# Patient Record
Sex: MALE | Race: WHITE | NOT HISPANIC OR LATINO | Employment: FULL TIME | URBAN - NONMETROPOLITAN AREA
[De-identification: names, ages, dates, MRNs, and addresses within clinical notes are randomized per-mention and may not be internally consistent; named-entity substitution may affect disease eponyms.]

---

## 2023-11-06 NOTE — PROGRESS NOTES
Assessment & Plan     1. Chronic cough  Chronic, progressing. Differential includes infection, seasonal allergies, postnasal drip, GERD, eosinophilic esophagitis, asthma, COPD, related to prior chemical exposures, other underlying pulmonary cause, underlying cardiac cause, malignancy, etc.  Vitals and exam stable.  Chest x-ray unremarkable. Will trial course of oral antihistamine and nasal steroid for coverage of sinus, allergies, and post nasal drip. If minimal improvement consider PFT and/or CT lung for additional evaluation.   - XR Chest 2 Views; Future        No follow-ups on file.    Emili Manuel PA-C  [facility] AND [facility]   Juan is a 58 year old, presenting for the following health issues:  Cough      HPI   Here for evaluation of cough similar symptoms.  Worse at night when laying down.  Somewhat productive.  Had similar symptoms for several months December last year that resolved and normal.  No associated fever/chills, sore throat, shortness of breath, wheezing, heartburn or reflux symptoms.  No known sick contacts.  No known seasonal allergies.  No known underlying history of cardiopulmonary disease.  Unable to review any prior medical records.  Reports the family history of lung cancer with his father.  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      .PAST MEDICAL HISTORY: History reviewed. No pertinent past medical history.    PAST SURGICAL HISTORY: History reviewed. No pertinent surgical history.    FAMILY HISTORY: History reviewed. No pertinent family history.    SOCIAL HISTORY:   Social History     Tobacco Use    Smoking status: Never    Smokeless tobacco: Never   Substance Use Topics    Alcohol use: Not on file      No Known Allergies  No current outpatient medications on file.     No current facility-administered medications for this visit.         Review of Systems   Per HPI        Objective    /78  "(BP Location: Right arm, Patient Position: Sitting, Cuff Size: Adult Regular)   Pulse 67   Temp 97.1  F (36.2  C) (Tympanic)   Resp 18   Ht 1.702 m (5' 7\")   Wt 70.2 kg (154 lb 12.8 oz)   SpO2 97%   BMI 24.25 kg/m    Body mass index is 24.25 kg/m .  Physical Exam   General: Pleasant, in no apparent distress.  Eyes: Sclera are white and conjunctiva are clear bilaterally. Lacrimal apparatus free of erythema, edema, and discharge bilaterally.  Ears: External ears without erythema or edema. Tympanic membranes are pearly white and without erythema, scarring or perforations bilaterally. External auditory canals are free of foreign bodies, erythema, ulcers, and masses.  Nose: External nose is symmetrical and free of lesions and deformities.   Oropharynx: Oral mucosa is pink and without ulcers, nodules, and white patches. Tongue is symmetrical, pink, and without masses or lesions. Pharynx is pink, symmetrical, and without lesions. Uvula is midline. Tonsils are pink, symmetrical, and without edema, ulcers, or exudates, and 1+ bilaterally.  Cardiovascular: Regular rate and rhythm with S1 equal to S2. No murmurs, friction rubs, or gallops.   Respiratory: Lungs are resonant and clear to auscultation bilaterally. No wheezes, crackles, or rhonchi.  Skin: No jaundice, pallor, rashes, or lesions on exposed skin.   Psych: Appropriate mood and affect.    No results found for any visits on 11/07/23.        "

## 2023-11-07 ENCOUNTER — OFFICE VISIT (OUTPATIENT)
Dept: FAMILY MEDICINE | Facility: OTHER | Age: 58
End: 2023-11-07
Attending: PHYSICIAN ASSISTANT
Payer: OTHER GOVERNMENT

## 2023-11-07 ENCOUNTER — HOSPITAL ENCOUNTER (OUTPATIENT)
Dept: GENERAL RADIOLOGY | Facility: OTHER | Age: 58
Discharge: HOME | End: 2023-11-07
Attending: PHYSICIAN ASSISTANT
Payer: OTHER GOVERNMENT

## 2023-11-07 VITALS
DIASTOLIC BLOOD PRESSURE: 78 MMHG | TEMPERATURE: 97.1 F | HEIGHT: 67 IN | RESPIRATION RATE: 18 BRPM | HEART RATE: 67 BPM | WEIGHT: 154.8 LBS | OXYGEN SATURATION: 97 % | BODY MASS INDEX: 24.3 KG/M2 | SYSTOLIC BLOOD PRESSURE: 125 MMHG

## 2023-11-07 DIAGNOSIS — R05.3 CHRONIC COUGH: Primary | ICD-10-CM

## 2023-11-07 DIAGNOSIS — R05.3 CHRONIC COUGH: ICD-10-CM

## 2023-11-07 PROCEDURE — 99214 OFFICE O/P EST MOD 30 MIN: CPT | Performed by: PHYSICIAN ASSISTANT

## 2023-11-07 PROCEDURE — 71046 X-RAY EXAM CHEST 2 VIEWS: CPT

## 2023-11-07 ASSESSMENT — PAIN SCALES - GENERAL: PAINLEVEL: NO PAIN (0)

## 2023-11-07 NOTE — NURSING NOTE
"Chief Complaint   Patient presents with    Cough       Initial /78 (BP Location: Right arm, Patient Position: Sitting, Cuff Size: Adult Regular)   Pulse 67   Temp 97.1  F (36.2  C) (Tympanic)   Resp 18   Ht 1.702 m (5' 7\")   Wt 70.2 kg (154 lb 12.8 oz)   SpO2 97%   BMI 24.25 kg/m   Estimated body mass index is 24.25 kg/m  as calculated from the following:    Height as of this encounter: 1.702 m (5' 7\").    Weight as of this encounter: 70.2 kg (154 lb 12.8 oz).  Medication Review: complete    The next two questions are to help us understand your food security.  If you are feeling you need any assistance in this area, we have resources available to support you today.          10/29/2023   SDOH- Food Insecurity   Within the past 12 months, did you worry that your food would run out before you got money to buy more? N   Within the past 12 months, did the food you bought just not last and you didn t have money to get more? N         Health Care Directive:  Patient does not have a Health Care Directive or Living Will: Discussed advance care planning with patient; information given to patient to review.    Yee Anderson      "

## 2023-11-08 RX ORDER — LORATADINE 10 MG/1
10 TABLET ORAL DAILY
Qty: 30 TABLET | Refills: 1 | Status: SHIPPED | OUTPATIENT
Start: 2023-11-08

## 2023-11-08 RX ORDER — FLUTICASONE PROPIONATE 50 MCG
1 SPRAY, SUSPENSION (ML) NASAL DAILY
Qty: 16 G | Refills: 1 | Status: SHIPPED | OUTPATIENT
Start: 2023-11-08

## 2023-11-21 ENCOUNTER — OFFICE VISIT (OUTPATIENT)
Dept: FAMILY MEDICINE | Facility: OTHER | Age: 58
End: 2023-11-21
Attending: FAMILY MEDICINE
Payer: OTHER GOVERNMENT

## 2023-11-21 VITALS
RESPIRATION RATE: 17 BRPM | BODY MASS INDEX: 24.5 KG/M2 | DIASTOLIC BLOOD PRESSURE: 70 MMHG | TEMPERATURE: 96.8 F | WEIGHT: 156.4 LBS | SYSTOLIC BLOOD PRESSURE: 106 MMHG | OXYGEN SATURATION: 97 % | HEART RATE: 70 BPM

## 2023-11-21 DIAGNOSIS — J01.00 ACUTE NON-RECURRENT MAXILLARY SINUSITIS: Primary | ICD-10-CM

## 2023-11-21 PROCEDURE — 99213 OFFICE O/P EST LOW 20 MIN: CPT | Performed by: FAMILY MEDICINE

## 2023-11-21 RX ORDER — AZITHROMYCIN 250 MG/1
TABLET, FILM COATED ORAL
Qty: 6 TABLET | Refills: 0 | Status: SHIPPED | OUTPATIENT
Start: 2023-11-21 | End: 2023-12-12

## 2023-11-21 RX ORDER — CHOLECALCIFEROL (VITAMIN D3) 50 MCG
TABLET ORAL
COMMUNITY
Start: 2023-11-01

## 2023-11-21 RX ORDER — PREDNISONE 20 MG/1
40 TABLET ORAL DAILY
Qty: 10 TABLET | Refills: 0 | Status: SHIPPED | OUTPATIENT
Start: 2023-11-21 | End: 2023-12-12

## 2023-11-21 RX ORDER — BENZONATATE 200 MG/1
200 CAPSULE ORAL 3 TIMES DAILY PRN
Qty: 30 CAPSULE | Refills: 1 | Status: SHIPPED | OUTPATIENT
Start: 2023-11-21

## 2023-11-21 NOTE — PROGRESS NOTES
Nursing Notes:   oRdy Willis LPN  11/21/2023  9:02 AM  Signed  Pt presents to clinic today for chronic cough.      Medication Reconciliation: complete  Rody Willis LPN    SUBJECTIVE:  Juan Anderson  is a 58 year old male who comes in today because of longstanding cough.  He saw Emili Manuel who had him try and oral antihistamine and nasal steroid. It didn't help.  He has never had to use an inhaler. No family history of asthma.  He is not a smoker.  No recent exposures.      His cough seems to be worse when lying down.  Had similar symptoms last year that resolved in December.  He has a family history of lung cancer in his father.  He had some occupational exposure to different chemicals.  He had had no fever chills sore throat shortness of breath wheezing heartburn or reflux symptoms.    He has trouble sleeping. He has had a bit of tan colored sputum.  No fever.  Cough is better during the day.     Past Medical, Family, and Social History reviewed and updated as noted below.   ROS is negative except as noted above       Allergies   Allergen Reactions    Cats Itching and Shortness Of Breath    Dogs Itching and Shortness Of Breath   , No family history on file.,   Current Outpatient Medications   Medication    azithromycin (ZITHROMAX) 250 MG tablet    benzonatate (TESSALON) 200 MG capsule    fluticasone (FLONASE) 50 MCG/ACT nasal spray    loratadine (CLARITIN) 10 MG tablet    predniSONE (DELTASONE) 20 MG tablet    QUERCETIN PO    vitamin D3 (CHOLECALCIFEROL) 50 mcg (2000 units) tablet     No current facility-administered medications for this visit.   , No past medical history on file., There are no problems to display for this patient.  , No past surgical history on file. and   Social History     Tobacco Use    Smoking status: Never    Smokeless tobacco: Never   Substance Use Topics    Alcohol use: Not on file     OBJECTIVE:  /70   Pulse 70   Temp 96.8  F (36  C) (Temporal)   Resp 17   Wt 70.9 kg  (156 lb 6.4 oz)   SpO2 97%   BMI 24.50 kg/m     EXAM:  Alert cooperative, no distress.  Nose is congested.  Sinuses are nontender to palpation but transilluminate poorly.  TMs are clear.  Throat is clear.  Neck is supple without significant adenopathy.  Lungs are clear, no rales rhonchi or wheezes are heard but on forced end expiration there is a scant end expiratory wheeze.  ASSESSMENT/Plan :    Juan was seen today for cough.    Diagnoses and all orders for this visit:    Acute non-recurrent maxillary sinusitis  -     azithromycin (ZITHROMAX) 250 MG tablet; Take 2 tablets (500 mg) by mouth daily for 1 day, THEN 1 tablet (250 mg) daily for 4 days.  -     predniSONE (DELTASONE) 20 MG tablet; Take 2 tablets (40 mg) by mouth daily for 5 days  -     benzonatate (TESSALON) 200 MG capsule; Take 1 capsule (200 mg) by mouth 3 times daily as needed for cough      I think he actually has a maxillary sinusitis.  He may have a little bit of reactive airway issue going on but I do not think he actually would need to use an inhaler.  Will treat with Zithromax and burst with prednisone for 5 days.  Will then use Tessalon Perles to suppress his cough.  If he has persistent issues, would then consider something like Symbicort for postinfectious cough to see if this would clear the rest of the way.  He will keep in touch with his progress.    Darnell Neil MD          Answers submitted by the patient for this visit:  General Questionnaire (Submitted on 11/21/2023)  Chief Complaint: Chronic problems general questions HPI Form  What is the reason for your visit today? : Chronic, worsening cough  How many servings of fruits and vegetables do you eat daily?: 2-3  On average, how many sweetened beverages do you drink each day (Examples: soda, juice, sweet tea, etc.  Do NOT count diet or artificially sweetened beverages)?: 4  How many minutes a day do you exercise enough to make your heart beat faster?: 9 or less  How many days a  week do you exercise enough to make your heart beat faster?: 3 or less  How many days per week do you miss taking your medication?: 0

## 2023-11-21 NOTE — NURSING NOTE
Pt presents to clinic today for chronic cough.      Medication Reconciliation: complete  Rody Willis LPN

## 2023-12-11 ENCOUNTER — MYC MEDICAL ADVICE (OUTPATIENT)
Dept: FAMILY MEDICINE | Facility: OTHER | Age: 58
End: 2023-12-11
Payer: OTHER GOVERNMENT

## 2023-12-11 DIAGNOSIS — J01.00 ACUTE NON-RECURRENT MAXILLARY SINUSITIS: ICD-10-CM

## 2023-12-12 RX ORDER — AZITHROMYCIN 250 MG/1
TABLET, FILM COATED ORAL
Qty: 6 TABLET | Refills: 0 | Status: SHIPPED | OUTPATIENT
Start: 2023-12-12 | End: 2023-12-16

## 2023-12-12 RX ORDER — PREDNISONE 20 MG/1
40 TABLET ORAL DAILY
Qty: 10 TABLET | Refills: 0 | Status: SHIPPED | OUTPATIENT
Start: 2023-12-12

## 2023-12-23 ENCOUNTER — HEALTH MAINTENANCE LETTER (OUTPATIENT)
Age: 58
End: 2023-12-23

## 2024-10-02 ENCOUNTER — HOSPITAL ENCOUNTER (OUTPATIENT)
Dept: GENERAL RADIOLOGY | Facility: OTHER | Age: 59
Discharge: HOME | End: 2024-10-02
Payer: OTHER GOVERNMENT

## 2024-10-02 ENCOUNTER — OFFICE VISIT (OUTPATIENT)
Dept: FAMILY MEDICINE | Facility: OTHER | Age: 59
End: 2024-10-02
Payer: OTHER GOVERNMENT

## 2024-10-02 VITALS
BODY MASS INDEX: 23.34 KG/M2 | TEMPERATURE: 97.5 F | DIASTOLIC BLOOD PRESSURE: 80 MMHG | SYSTOLIC BLOOD PRESSURE: 110 MMHG | OXYGEN SATURATION: 96 % | RESPIRATION RATE: 16 BRPM | WEIGHT: 154 LBS | HEART RATE: 72 BPM | HEIGHT: 68 IN

## 2024-10-02 DIAGNOSIS — R05.3 PERSISTENT COUGH FOR 3 WEEKS OR LONGER: ICD-10-CM

## 2024-10-02 DIAGNOSIS — K21.9 GASTROESOPHAGEAL REFLUX DISEASE, UNSPECIFIED WHETHER ESOPHAGITIS PRESENT: Primary | ICD-10-CM

## 2024-10-02 DIAGNOSIS — H61.21 IMPACTED CERUMEN OF RIGHT EAR: ICD-10-CM

## 2024-10-02 LAB
ALBUMIN SERPL BCG-MCNC: 4.3 G/DL (ref 3.5–5.2)
ALP SERPL-CCNC: 76 U/L (ref 40–150)
ALT SERPL W P-5'-P-CCNC: 21 U/L (ref 0–70)
ANION GAP SERPL CALCULATED.3IONS-SCNC: 9 MMOL/L (ref 7–15)
AST SERPL W P-5'-P-CCNC: 17 U/L (ref 0–45)
BASOPHILS # BLD AUTO: 0.1 10E3/UL (ref 0–0.2)
BASOPHILS NFR BLD AUTO: 1 %
BILIRUB SERPL-MCNC: 0.4 MG/DL
BUN SERPL-MCNC: 10.3 MG/DL (ref 8–23)
CALCIUM SERPL-MCNC: 9.6 MG/DL (ref 8.8–10.4)
CHLORIDE SERPL-SCNC: 107 MMOL/L (ref 98–107)
CREAT SERPL-MCNC: 0.96 MG/DL (ref 0.67–1.17)
EGFRCR SERPLBLD CKD-EPI 2021: 90 ML/MIN/1.73M2
EOSINOPHIL # BLD AUTO: 1 10E3/UL (ref 0–0.7)
EOSINOPHIL NFR BLD AUTO: 12 %
ERYTHROCYTE [DISTWIDTH] IN BLOOD BY AUTOMATED COUNT: 12.7 % (ref 10–15)
GLUCOSE SERPL-MCNC: 104 MG/DL (ref 70–99)
HCO3 SERPL-SCNC: 26 MMOL/L (ref 22–29)
HCT VFR BLD AUTO: 49.9 % (ref 40–53)
HGB BLD-MCNC: 17.1 G/DL (ref 13.3–17.7)
IMM GRANULOCYTES # BLD: 0 10E3/UL
IMM GRANULOCYTES NFR BLD: 0 %
LYMPHOCYTES # BLD AUTO: 2.2 10E3/UL (ref 0.8–5.3)
LYMPHOCYTES NFR BLD AUTO: 27 %
MCH RBC QN AUTO: 31.6 PG (ref 26.5–33)
MCHC RBC AUTO-ENTMCNC: 34.3 G/DL (ref 31.5–36.5)
MCV RBC AUTO: 92 FL (ref 78–100)
MONOCYTES # BLD AUTO: 0.9 10E3/UL (ref 0–1.3)
MONOCYTES NFR BLD AUTO: 11 %
NEUTROPHILS # BLD AUTO: 4 10E3/UL (ref 1.6–8.3)
NEUTROPHILS NFR BLD AUTO: 49 %
NRBC # BLD AUTO: 0 10E3/UL
NRBC BLD AUTO-RTO: 0 /100
PLATELET # BLD AUTO: 272 10E3/UL (ref 150–450)
POTASSIUM SERPL-SCNC: 4.4 MMOL/L (ref 3.4–5.3)
PROT SERPL-MCNC: 7.4 G/DL (ref 6.4–8.3)
RBC # BLD AUTO: 5.41 10E6/UL (ref 4.4–5.9)
SODIUM SERPL-SCNC: 142 MMOL/L (ref 135–145)
WBC # BLD AUTO: 8.2 10E3/UL (ref 4–11)

## 2024-10-02 PROCEDURE — 69209 REMOVE IMPACTED EAR WAX UNI: CPT

## 2024-10-02 PROCEDURE — 80053 COMPREHEN METABOLIC PANEL: CPT | Mod: ZL

## 2024-10-02 PROCEDURE — 85025 COMPLETE CBC W/AUTO DIFF WBC: CPT | Mod: ZL

## 2024-10-02 PROCEDURE — 36415 COLL VENOUS BLD VENIPUNCTURE: CPT | Mod: ZL

## 2024-10-02 PROCEDURE — 71046 X-RAY EXAM CHEST 2 VIEWS: CPT

## 2024-10-02 PROCEDURE — 99214 OFFICE O/P EST MOD 30 MIN: CPT

## 2024-10-02 ASSESSMENT — PAIN SCALES - GENERAL: PAINLEVEL: NO PAIN (0)

## 2024-10-02 NOTE — PROGRESS NOTES
ASSESSMENT/PLAN:    I have reviewed the nursing notes.  I have reviewed the findings, diagnosis, plan and need for follow up with the patient.    1. Impacted cerumen of right ear    - REMOVE IMPACTED CERUMEN- ear lavage    2. Persistent cough for 3 weeks or longer    - XR Chest 2 Views- no focal consolidation, effusion or pneumothorax.  No suspicious osseous lesion or subdiaphragmatic free air.  No acute findings per radiologist.    - Comprehensive Metabolic Panel- unremarkable results    - CBC and Differential- unremarkable results    3. Gastroesophageal reflux disease, unspecified whether esophagitis present    - omeprazole (PRILOSEC) 20 MG DR capsule; Take 1 capsule (20 mg) by mouth 2 times daily for 14 days.  Dispense: 28 capsule; Refill: 0    - Please read the attached information on GERD and acid reducing medications for at home care treatment as discussed in the clinic today.    - Please return to clinic in 2 weeks for follow-up on effectiveness of medication that will be started today.    - May use over-the-counter Tylenol and ibuprofen as needed for pain, inflammation or fever    - Discussed warning signs/symptoms indicative of need to f/u    - Follow up if symptoms persist or worsen or concerns    - I explained my diagnostic considerations and recommendations to the patient, who voiced understanding and agreement with the treatment plan. All questions were answered. We discussed potential side effects of any prescribed or recommended therapies, as well as expectations for response to treatments.    ROYCE Barton CNP  10/2/2024  9:40 AM    HPI:    Juan Anderson is a 59 year old male who presents to [facility] today for concerns of cough for the past 2 years.  States that he occasionally has a productive cough that is clear now, used to be brownish color.  Non smoker.  States that cough is worse around 0200.  Denies having acid reflux.  At home care treatment consists of nothing other than cough  "drops.  Feels that the cough is getting worse the past week, states he coughed for 2 hours straight.  Denies fever, chills, shortness of breath, chest pain, sore throat, rhinorrhea, congestion, otalgia, headache, lightheadedness, dizziness, nausea, vomiting, diarrhea, constipation or rash.    No past medical history on file.  No past surgical history on file.  Social History     Tobacco Use    Smoking status: Never    Smokeless tobacco: Never   Substance Use Topics    Alcohol use: Not Currently     Current Outpatient Medications   Medication Sig Dispense Refill    benzonatate (TESSALON) 200 MG capsule Take 1 capsule (200 mg) by mouth 3 times daily as needed for cough 30 capsule 1    loratadine (CLARITIN) 10 MG tablet Take 1 tablet (10 mg) by mouth daily 30 tablet 1    fluticasone (FLONASE) 50 MCG/ACT nasal spray Spray 1 spray into both nostrils daily (Patient not taking: Reported on 10/2/2024) 16 g 1    predniSONE (DELTASONE) 20 MG tablet Take 2 tablets (40 mg) by mouth daily (Patient not taking: Reported on 10/2/2024) 10 tablet 0    QUERCETIN  mg (Patient not taking: Reported on 10/2/2024)      vitamin D3 (CHOLECALCIFEROL) 50 mcg (2000 units) tablet  (Patient not taking: Reported on 10/2/2024)       Allergies   Allergen Reactions    Cats Itching and Shortness Of Breath    Dogs Itching and Shortness Of Breath     Past medical history, past surgical history, current medications and allergies reviewed and accurate to the best of my knowledge.      ROS:  Refer to HPI    /80 (BP Location: Right arm, Patient Position: Sitting, Cuff Size: Adult Regular)   Pulse 72   Temp 97.5  F (36.4  C) (Temporal)   Resp 16   Ht 1.727 m (5' 8\")   Wt 69.9 kg (154 lb)   SpO2 96%   BMI 23.42 kg/m      EXAM:  General Appearance: Well appearing 59 year old male, appropriate appearance for age. No acute distress   Ears: Left TM intact, translucent with bony landmarks appreciated, no erythema, no effusion, no bulging, no " purulence.  After ear lavage, Right TM intact, translucent with bony landmarks appreciated, no erythema, no effusion, no bulging, no purulence.  Left auditory canal clear.  Right auditory canal clear.  Normal external ears, non tender.  Eyes: conjunctivae normal without erythema or irritation, corneas clear, no drainage or crusting, no eyelid swelling, pupils equal   Oropharynx: moist mucous membranes, posterior pharynx with mild erythema, tonsils symmetric, no erythema, no exudates or petechiae, no post nasal drip seen, no trismus, voice clear.    Sinuses:  No sinus tenderness upon palpation of the frontal or maxillary sinuses  Nose:  Bilateral nares: no erythema, no edema, no drainage or congestion   Neck: supple without adenopathy  Respiratory: normal chest wall and respirations.  Normal effort.  Clear to auscultation bilaterally, no wheezing, crackles or rhonchi.  No increased work of breathing.  Occasional dry cough appreciated.  Cardiac: RRR with no murmurs   Musculoskeletal:  Equal movement of bilateral upper extremities.  Equal movement of bilateral lower extremities.  Normal gait.    Neuro: Alert and oriented to person, place, and time.    Psychological: normal affect, alert, oriented, and pleasant.     Labs: Xray:  Results for orders placed or performed in visit on 10/02/24   XR Chest 2 Views     Status: None    Narrative    PROCEDURE:  XR CHEST 2 VIEWS    HISTORY: Persistent cough for 3 weeks or longer    COMPARISON: Chest radiographs 11/7/2023    FINDINGS: PA and lateral chest radiographs    Cardiomediastinal silhouette is within normal limits.  No focal consolidation, effusion or pneumothorax.    No suspicious osseous lesion or subdiaphragmatic free air.      Impression    IMPRESSION:    No acute findings.    YESENIA LENZ MD         SYSTEM ID:  [id number]   Comprehensive Metabolic Panel     Status: Abnormal   Result Value Ref Range    Sodium 142 135 - 145 mmol/L    Potassium 4.4 3.4 - 5.3 mmol/L    Carbon  Dioxide (CO2) 26 22 - 29 mmol/L    Anion Gap 9 7 - 15 mmol/L    Urea Nitrogen 10.3 8.0 - 23.0 mg/dL    Creatinine 0.96 0.67 - 1.17 mg/dL    GFR Estimate 90 >60 mL/min/1.73m2    Calcium 9.6 8.8 - 10.4 mg/dL    Chloride 107 98 - 107 mmol/L    Glucose 104 (H) 70 - 99 mg/dL    Alkaline Phosphatase 76 40 - 150 U/L    AST 17 0 - 45 U/L    ALT 21 0 - 70 U/L    Protein Total 7.4 6.4 - 8.3 g/dL    Albumin 4.3 3.5 - 5.2 g/dL    Bilirubin Total 0.4 <=1.2 mg/dL   CBC with platelets and differential     Status: Abnormal   Result Value Ref Range    WBC Count 8.2 4.0 - 11.0 10e3/uL    RBC Count 5.41 4.40 - 5.90 10e6/uL    Hemoglobin 17.1 13.3 - 17.7 g/dL    Hematocrit 49.9 40.0 - 53.0 %    MCV 92 78 - 100 fL    MCH 31.6 26.5 - 33.0 pg    MCHC 34.3 31.5 - 36.5 g/dL    RDW 12.7 10.0 - 15.0 %    Platelet Count 272 150 - 450 10e3/uL    % Neutrophils 49 %    % Lymphocytes 27 %    % Monocytes 11 %    % Eosinophils 12 %    % Basophils 1 %    % Immature Granulocytes 0 %    NRBCs per 100 WBC 0 <1 /100    Absolute Neutrophils 4.0 1.6 - 8.3 10e3/uL    Absolute Lymphocytes 2.2 0.8 - 5.3 10e3/uL    Absolute Monocytes 0.9 0.0 - 1.3 10e3/uL    Absolute Eosinophils 1.0 (H) 0.0 - 0.7 10e3/uL    Absolute Basophils 0.1 0.0 - 0.2 10e3/uL    Absolute Immature Granulocytes 0.0 <=0.4 10e3/uL    Absolute NRBCs 0.0 10e3/uL   CBC and Differential     Status: Abnormal    Narrative    The following orders were created for panel order CBC and Differential.  Procedure                               Abnormality         Status                     ---------                               -----------         ------                     CBC with platelets and d...[[id number]]  Abnormal            Final result                 Please view results for these tests on the individual orders.

## 2024-10-02 NOTE — PATIENT INSTRUCTIONS
I hope you feel better soon Juan!  Please follow-up in clinic in 2 weeks in regards to symptoms improving or not.

## 2024-10-02 NOTE — PROGRESS NOTES
Patient identified using two patient identifiers.  Ear exam showing wax occlusion completed by provider.  Solution: warm water was placed in the right ear(s) via irrigation tool: elephant ear.      Nhung Prabhakar LPN on 10/2/2024 at 12:16 PM

## 2024-10-02 NOTE — NURSING NOTE
"Pt presents to  for cough. Pt states he has had cough for a few years, but it has been worsening x1 wk. Pt has been taking loratadine and benzonatate PRN. Pt states in years past he was prescribed prednisone, azithromycin, tessalon and loratadine.     Chief Complaint   Patient presents with    Cough       FOOD SECURITY SCREENING QUESTIONS  Hunger Vital Signs:  Within the past 12 months we worried whether our food would run out before we got money to buy more. Never  Within the past 12 months the food we bought just didn't last and we didn't have money to get more. Never  Cherie Deajedon 10/2/2024 9:34 AM      Initial /80 (BP Location: Right arm, Patient Position: Sitting, Cuff Size: Adult Regular)   Pulse 72   Temp 97.5  F (36.4  C) (Temporal)   Resp 16   Ht 1.727 m (5' 8\")   Wt 69.9 kg (154 lb)   SpO2 96%   BMI 23.42 kg/m   Estimated body mass index is 23.42 kg/m  as calculated from the following:    Height as of this encounter: 1.727 m (5' 8\").    Weight as of this encounter: 69.9 kg (154 lb).  Medication Reconciliation: complete    Cherie Oscar  "

## 2024-10-17 ENCOUNTER — OFFICE VISIT (OUTPATIENT)
Dept: FAMILY MEDICINE | Facility: OTHER | Age: 59
End: 2024-10-17
Payer: OTHER GOVERNMENT

## 2024-10-17 VITALS
BODY MASS INDEX: 22.58 KG/M2 | RESPIRATION RATE: 16 BRPM | OXYGEN SATURATION: 97 % | WEIGHT: 149 LBS | TEMPERATURE: 97.3 F | SYSTOLIC BLOOD PRESSURE: 112 MMHG | DIASTOLIC BLOOD PRESSURE: 72 MMHG | HEART RATE: 61 BPM | HEIGHT: 68 IN

## 2024-10-17 DIAGNOSIS — R06.2 WHEEZING: ICD-10-CM

## 2024-10-17 DIAGNOSIS — R05.3 CHRONIC COUGH: Primary | ICD-10-CM

## 2024-10-17 DIAGNOSIS — K21.00 GASTROESOPHAGEAL REFLUX DISEASE WITH ESOPHAGITIS, UNSPECIFIED WHETHER HEMORRHAGE: ICD-10-CM

## 2024-10-17 PROCEDURE — 99214 OFFICE O/P EST MOD 30 MIN: CPT

## 2024-10-17 RX ORDER — ALBUTEROL SULFATE 90 UG/1
2 INHALANT RESPIRATORY (INHALATION) EVERY 6 HOURS PRN
Qty: 18 G | Refills: 3 | Status: SHIPPED | OUTPATIENT
Start: 2024-10-17

## 2024-10-17 RX ORDER — FAMOTIDINE 20 MG/1
20 TABLET, FILM COATED ORAL AT BEDTIME
Qty: 90 TABLET | Refills: 0 | Status: SHIPPED | OUTPATIENT
Start: 2024-10-17

## 2024-10-17 RX ORDER — OMEPRAZOLE 20 MG/1
20 TABLET, DELAYED RELEASE ORAL DAILY
Qty: 90 TABLET | Refills: 0 | Status: SHIPPED | OUTPATIENT
Start: 2024-10-17

## 2024-10-17 RX ORDER — FLUTICASONE PROPIONATE AND SALMETEROL 100; 50 UG/1; UG/1
1 POWDER RESPIRATORY (INHALATION) EVERY 12 HOURS
Qty: 60 EACH | Refills: 3 | Status: SHIPPED | OUTPATIENT
Start: 2024-10-17

## 2024-10-17 ASSESSMENT — PAIN SCALES - GENERAL: PAINLEVEL: NO PAIN (0)

## 2024-10-17 ASSESSMENT — LIFESTYLE VARIABLES
HOW OFTEN DO YOU HAVE A DRINK CONTAINING ALCOHOL: NEVER
HOW OFTEN DO YOU HAVE SIX OR MORE DRINKS ON ONE OCCASION: NEVER
HOW MANY STANDARD DRINKS CONTAINING ALCOHOL DO YOU HAVE ON A TYPICAL DAY: PATIENT DOES NOT DRINK
SKIP TO QUESTIONS 9-10: 1
AUDIT-C TOTAL SCORE: 0

## 2024-10-17 NOTE — PATIENT INSTRUCTIONS
I hope you feel that her soon Juan!  Please be sure to establish a PCP as soon as possible for follow-up in the next 2 to 4 weeks.

## 2024-10-17 NOTE — PROGRESS NOTES
ASSESSMENT/PLAN:    I have reviewed the nursing notes.  I have reviewed the findings, diagnosis, plan and need for follow up with the patient.    1. Gastroesophageal reflux disease with esophagitis, unspecified whether hemorrhage    - Adult GI  Referral - Consult Only; Future    - omeprazole (PRILOSEC OTC) 20 MG EC tablet; Take 1 tablet (20 mg) by mouth daily.  Dispense: 90 tablet; Refill: 0    - famotidine (PEPCID) 20 MG tablet; Take 1 tablet (20 mg) by mouth at bedtime.  Dispense: 90 tablet; Refill: 0    2. Wheezing  3. Chronic cough    - Pulmonary Function Test (); Future    - fluticasone-salmeterol (ADVAIR) 100-50 MCG/ACT inhaler; Inhale 1 puff into the lungs every 12 hours.  Dispense: 60 each; Refill: 3    - albuterol (PROAIR HFA/PROVENTIL HFA/VENTOLIN HFA) 108 (90 Base) MCG/ACT inhaler; Inhale 2 puffs into the lungs every 6 hours as needed for shortness of breath, wheezing or cough.  Dispense: 18 g; Refill: 3    - Recommended establishing a PCP to follow-up with in the next 2 to 4 weeks.     - May use over-the-counter Tylenol and ibuprofen as needed for pain, inflammation or fever    - Discussed warning signs/symptoms indicative of need to f/u    - Follow up if symptoms persist or worsen or concerns    - I explained my diagnostic considerations and recommendations to the patient, who voiced understanding and agreement with the treatment plan. All questions were answered. We discussed potential side effects of any prescribed or recommended therapies, as well as expectations for response to treatments.    ROYCE Barton CNP  10/17/2024  9:18 AM    HPI:    Juan Anderson is a 59 year old male who presents to [facility] today for concerns of cough for the past 2 years and wheezing after bad coughing spell.  States that his coughing spells have decreased to 2 a day since he began the omeprazole a couple weeks ago.  Prior to omeprazole he was having longer coughing spells.  Patient was seen on  "10/02/2024 for this chronic cough and possible GERD and was given a 2-week trial of omeprazole and told to follow-up with a PCP.  Patient states that he has definitely noticed a difference with his coughing spells not being as harsh or lasting as long but does have wheezing after coughing spells.  Patient states that he is not only concerned about GERD but that he may have asthma.  Patient denies fever, chills, shortness of breath, chest pain, nausea, vomiting, diarrhea, constipation, headache, lightheadedness, dizziness, sore throat, congestion, rhinorrhea, otalgia or rash.    No past medical history on file.  No past surgical history on file.  Social History     Tobacco Use    Smoking status: Never    Smokeless tobacco: Never   Substance Use Topics    Alcohol use: Not Currently     Current Outpatient Medications   Medication Sig Dispense Refill    benzonatate (TESSALON) 200 MG capsule Take 1 capsule (200 mg) by mouth 3 times daily as needed for cough (Patient not taking: Reported on 10/17/2024) 30 capsule 1    fluticasone (FLONASE) 50 MCG/ACT nasal spray Spray 1 spray into both nostrils daily (Patient not taking: Reported on 10/2/2024) 16 g 1    loratadine (CLARITIN) 10 MG tablet Take 1 tablet (10 mg) by mouth daily (Patient not taking: Reported on 10/17/2024) 30 tablet 1    predniSONE (DELTASONE) 20 MG tablet Take 2 tablets (40 mg) by mouth daily (Patient not taking: Reported on 10/2/2024) 10 tablet 0    QUERCETIN  mg (Patient not taking: Reported on 10/2/2024)      vitamin D3 (CHOLECALCIFEROL) 50 mcg (2000 units) tablet  (Patient not taking: Reported on 10/2/2024)       Allergies   Allergen Reactions    Cats Itching and Shortness Of Breath    Dogs Itching and Shortness Of Breath     Past medical history, past surgical history, current medications and allergies reviewed and accurate to the best of my knowledge.      ROS:  Refer to [facility]    Temp 97.3  F (36.3  C) (Tympanic)   Ht 1.727 m (5' 8\")   Wt 67.6 kg " (149 lb)   BMI 22.66 kg/m      EXAM:  General Appearance: Well appearing 59 year old male, appropriate appearance for age. No acute distress   Respiratory: normal chest wall and respirations.  Normal effort.  Clear to auscultation bilaterally, no wheezing, crackles or rhonchi.  No increased work of breathing.  No cough appreciated.  Cardiac: RRR with no murmurs  Musculoskeletal:  Equal movement of bilateral upper extremities.  Equal movement of bilateral lower extremities.  Normal gait.    Neuro: Alert and oriented to person, place, and time.    Psychological: normal affect, alert, oriented, and pleasant.

## 2024-10-17 NOTE — NURSING NOTE
"Chief Complaint   Patient presents with    Cough     With wheezing after coughing fits     Coughing and wheezing is ongoing and worsening over the past 2 years.  Tx with Omeprazole- which has lessened  length of coughing fits - but cough is still persistent.  Elevation at night helps. Improves after the rain.  Cough worsens with cold air, dust, laughing/activity, and when lying down.    Patient is concerned with asthma.    Initial /72 (BP Location: Left arm, Patient Position: Sitting, Cuff Size: Adult Regular)   Pulse 61   Temp 97.3  F (36.3  C) (Tympanic)   Resp 16   Ht 1.727 m (5' 8\")   Wt 67.6 kg (149 lb)   SpO2 97%   BMI 22.66 kg/m   Estimated body mass index is 22.66 kg/m  as calculated from the following:    Height as of this encounter: 1.727 m (5' 8\").    Weight as of this encounter: 67.6 kg (149 lb).     Advance Care Directive on file? n    FOOD SECURITY SCREENING QUESTIONS:    The next two questions are to help us understand your food security.  If you are feeling you need any assistance in this area, we have resources available to support you today.    Hunger Vital Signs:  Within the past 12 months we worried whether our food would run out before we got money to buy more. Never  Within the past 12 months the food we bought just didn't last and we didn't have money to get more. Never  Adriana Moore LPN,GORAN on 10/17/2024 at 9:19 AM      Adriana Moore LPN     "

## 2024-10-18 ENCOUNTER — TELEPHONE (OUTPATIENT)
Dept: SURGERY | Facility: OTHER | Age: 59
End: 2024-10-18
Payer: OTHER GOVERNMENT

## 2024-10-18 NOTE — TELEPHONE ENCOUNTER
[facility] Diagnostic Referral    Patient has a referral for an EGD with a diagnosis of Gastroesophageal reflux disease with esophagitis, unspecified whether hemorrhage.    Please advise.    Thank you,  Ronit Kahn on 10/18/2024 at 2:29 PM

## 2024-11-06 ENCOUNTER — OFFICE VISIT (OUTPATIENT)
Dept: FAMILY MEDICINE | Facility: OTHER | Age: 59
End: 2024-11-06
Attending: STUDENT IN AN ORGANIZED HEALTH CARE EDUCATION/TRAINING PROGRAM
Payer: OTHER GOVERNMENT

## 2024-11-06 VITALS
OXYGEN SATURATION: 98 % | TEMPERATURE: 97.1 F | HEIGHT: 68 IN | BODY MASS INDEX: 22.19 KG/M2 | RESPIRATION RATE: 18 BRPM | DIASTOLIC BLOOD PRESSURE: 64 MMHG | HEART RATE: 63 BPM | SYSTOLIC BLOOD PRESSURE: 106 MMHG | WEIGHT: 146.38 LBS

## 2024-11-06 DIAGNOSIS — R05.3 CHRONIC COUGH: Primary | ICD-10-CM

## 2024-11-06 DIAGNOSIS — R06.2 WHEEZING: ICD-10-CM

## 2024-11-06 PROCEDURE — 99214 OFFICE O/P EST MOD 30 MIN: CPT | Performed by: STUDENT IN AN ORGANIZED HEALTH CARE EDUCATION/TRAINING PROGRAM

## 2024-11-06 ASSESSMENT — PAIN SCALES - GENERAL: PAINLEVEL_OUTOF10: NO PAIN (0)

## 2024-11-06 NOTE — PROGRESS NOTES
Assessment & Plan   Problem List Items Addressed This Visit          Respiratory    Chronic cough - Primary    Wheezing      Suspect this is asthma as his symptoms flare in the fall (seasonal allergies?) as well as cold air and dust.   Likely some lung damage due to serving in [place]-- [facility]  Reassuring that symptoms resolved with advair use  Recommend getting PFT scheduled. Is not taking ppi or pepcid, ok to hold off for now and ok to hold off on EGD for now  Follow up if new or worsening symptoms              No follow-ups on file.      Gabbie Martinez is a 59 year old, presenting for the following health issues:  Establish Care (With Jostin Navarro MD ) and Medication Follow-up (Medication mangement)    History of Present Illness     Asthma:  He presents for follow up of asthma.  He has some cough, some wheezing, and some shortness of breath. He is not using a relief medication.  He does not miss any doses of his controller medication throughout the week. Patient is aware of the following triggers: cold air, dust mites, exercise or sports, gastric reflux, mold and pollens. The patient has had a visit to the Emergency Room, Urgent Care or Hospital due to asthma since the last clinic visit. He has been to the Emergency Room or Urgent Care 2 times.He has had a Hospitalization 0 times.    Reason for visit:  [facility] and [facility]    He eats 2-3 servings of fruits and vegetables daily.He consumes 1 sweetened beverage(s) daily.He exercises with enough effort to increase his heart rate 30 to 60 minutes per day.  He exercises with enough effort to increase his heart rate 3 or less days per week.   He is taking medications regularly.     Chronic cough/establish care  - 2 years of intermittent cough  - mostly notices each fall-- cold air and dust cause it to flare  - [facility]-- cxr negative for acute pathology. Rx for  "advair and albuterol, as well as pepcid and prilosec.   - advair cleared symptoms almost immediately  - no tobacco use  - no reflux or PND   - - served in [place] so has exposures. [facility]  - EGD and PFTs ordered not yet completed               Review of Systems  Constitutional, HEENT, cardiovascular, pulmonary, gi and gu systems are negative, except as otherwise noted.      Objective    /64 (BP Location: Right arm, Patient Position: Sitting, Cuff Size: Adult Regular)   Pulse 63   Temp 97.1  F (36.2  C) (Tympanic)   Resp 18   Ht 1.715 m (5' 7.5\")   Wt 66.4 kg (146 lb 6 oz)   SpO2 98%   BMI 22.59 kg/m    Body mass index is 22.59 kg/m .  Physical Exam   GENERAL: alert and no distress  RESP: lungs clear to auscultation - no rales, rhonchi or wheezes  CV: regular rate and rhythm, normal S1 S2, no S3 or S4, no murmur, click or rub, no peripheral edema   PSYCH: mentation appears normal, affect normal/bright    Office Visit on 10/02/2024   Component Date Value Ref Range Status    Sodium 10/02/2024 142  135 - 145 mmol/L Final    Potassium 10/02/2024 4.4  3.4 - 5.3 mmol/L Final    Carbon Dioxide (CO2) 10/02/2024 26  22 - 29 mmol/L Final    Anion Gap 10/02/2024 9  7 - 15 mmol/L Final    Urea Nitrogen 10/02/2024 10.3  8.0 - 23.0 mg/dL Final    Creatinine 10/02/2024 0.96  0.67 - 1.17 mg/dL Final    GFR Estimate 10/02/2024 90  >60 mL/min/1.73m2 Final    eGFR calculated using 2021 CKD-EPI equation.    Calcium 10/02/2024 9.6  8.8 - 10.4 mg/dL Final    Reference intervals for this test were updated on 7/16/2024 to reflect our healthy population more accurately. There may be differences in the flagging of prior results with similar values performed with this method. Those prior results can be interpreted in the context of the updated reference intervals.    Chloride 10/02/2024 107  98 - 107 mmol/L Final    Glucose 10/02/2024 104 (H)  70 - 99 mg/dL Final    Alkaline Phosphatase 10/02/2024 76  40 - 150 " U/L Final    AST 10/02/2024 17  0 - 45 U/L Final    ALT 10/02/2024 21  0 - 70 U/L Final    Protein Total 10/02/2024 7.4  6.4 - 8.3 g/dL Final    Albumin 10/02/2024 4.3  3.5 - 5.2 g/dL Final    Bilirubin Total 10/02/2024 0.4  <=1.2 mg/dL Final    WBC Count 10/02/2024 8.2  4.0 - 11.0 10e3/uL Final    RBC Count 10/02/2024 5.41  4.40 - 5.90 10e6/uL Final    Hemoglobin 10/02/2024 17.1  13.3 - 17.7 g/dL Final    Hematocrit 10/02/2024 49.9  40.0 - 53.0 % Final    MCV 10/02/2024 92  78 - 100 fL Final    MCH 10/02/2024 31.6  26.5 - 33.0 pg Final    MCHC 10/02/2024 34.3  31.5 - 36.5 g/dL Final    RDW 10/02/2024 12.7  10.0 - 15.0 % Final    Platelet Count 10/02/2024 272  150 - 450 10e3/uL Final    % Neutrophils 10/02/2024 49  % Final    % Lymphocytes 10/02/2024 27  % Final    % Monocytes 10/02/2024 11  % Final    % Eosinophils 10/02/2024 12  % Final    % Basophils 10/02/2024 1  % Final    % Immature Granulocytes 10/02/2024 0  % Final    NRBCs per 100 WBC 10/02/2024 0  <1 /100 Final    Absolute Neutrophils 10/02/2024 4.0  1.6 - 8.3 10e3/uL Final    Absolute Lymphocytes 10/02/2024 2.2  0.8 - 5.3 10e3/uL Final    Absolute Monocytes 10/02/2024 0.9  0.0 - 1.3 10e3/uL Final    Absolute Eosinophils 10/02/2024 1.0 (H)  0.0 - 0.7 10e3/uL Final    Absolute Basophils 10/02/2024 0.1  0.0 - 0.2 10e3/uL Final    Absolute Immature Granulocytes 10/02/2024 0.0  <=0.4 10e3/uL Final    Absolute NRBCs 10/02/2024 0.0  10e3/uL Final           Signed Electronically by: Jostin Ritchie MD

## 2024-11-06 NOTE — NURSING NOTE
"Chief Complaint   Patient presents with    Establish Care     With Jostin Navarro MD     Medication Follow-up     Medication mangement       Initial /64 (BP Location: Right arm, Patient Position: Sitting, Cuff Size: Adult Regular)   Pulse 63   Temp 97.1  F (36.2  C) (Tympanic)   Resp 18   Ht 1.715 m (5' 7.5\")   Wt 66.4 kg (146 lb 6 oz)   SpO2 98%   BMI 22.59 kg/m   Estimated body mass index is 22.59 kg/m  as calculated from the following:    Height as of this encounter: 1.715 m (5' 7.5\").    Weight as of this encounter: 66.4 kg (146 lb 6 oz).  Medication Review: complete    The next two questions are to help us understand your food security.  If you are feeling you need any assistance in this area, we have resources available to support you today.          11/21/2023   SDOH- Food Insecurity   Within the past 12 months, did you worry that your food would run out before you got money to buy more? N    Within the past 12 months, did the food you bought just not last and you didn t have money to get more? N        Patient-reported         Health Care Directive:  Patient does not have a Health Care Directive: Discussed advance care planning with patient; however, patient declined at this time.    Cherie Marion LPN      "

## 2024-11-25 ENCOUNTER — HOSPITAL ENCOUNTER (OUTPATIENT)
Dept: RESPIRATORY THERAPY | Facility: OTHER | Age: 59
Discharge: HOME | End: 2024-11-25
Attending: PHYSICAL MEDICINE & REHABILITATION | Admitting: PHYSICAL MEDICINE & REHABILITATION
Payer: OTHER GOVERNMENT

## 2024-11-25 DIAGNOSIS — R05.3 CHRONIC COUGH: ICD-10-CM

## 2024-11-25 DIAGNOSIS — R06.2 WHEEZING: ICD-10-CM

## 2024-11-25 PROCEDURE — 999N000157 HC STATISTIC RCP TIME EA 10 MIN

## 2024-11-25 PROCEDURE — 94726 PLETHYSMOGRAPHY LUNG VOLUMES: CPT

## 2024-11-25 PROCEDURE — 94010 BREATHING CAPACITY TEST: CPT

## 2024-11-25 PROCEDURE — 94729 DIFFUSING CAPACITY: CPT

## 2025-01-12 ENCOUNTER — HEALTH MAINTENANCE LETTER (OUTPATIENT)
Age: 60
End: 2025-01-12

## 2025-03-14 PROBLEM — Z57.39 OCCUPATIONAL EXPOSURE TO AIR CONTAMINANTS: Status: ACTIVE | Noted: 2025-03-14

## 2025-03-14 PROBLEM — J45.30 MILD PERSISTENT REACTIVE AIRWAY DISEASE WITHOUT COMPLICATION: Status: ACTIVE | Noted: 2025-03-14

## 2025-05-27 DIAGNOSIS — J45.909 ASTHMA: Primary | ICD-10-CM

## 2025-06-13 ENCOUNTER — HOSPITAL ENCOUNTER (OUTPATIENT)
Dept: RESPIRATORY THERAPY | Facility: OTHER | Age: 60
Discharge: HOME | End: 2025-06-13
Admitting: STUDENT IN AN ORGANIZED HEALTH CARE EDUCATION/TRAINING PROGRAM
Payer: OTHER GOVERNMENT

## 2025-06-13 DIAGNOSIS — J45.909 ASTHMA: Primary | ICD-10-CM

## 2025-06-13 PROCEDURE — 94010 BREATHING CAPACITY TEST: CPT | Mod: 26

## 2025-06-13 PROCEDURE — 94060 EVALUATION OF WHEEZING: CPT

## 2025-06-13 PROCEDURE — 999N000157 HC STATISTIC RCP TIME EA 10 MIN

## 2025-06-13 PROCEDURE — 250N000009 HC RX 250

## 2025-06-13 RX ORDER — ALBUTEROL SULFATE 0.83 MG/ML
2.5 SOLUTION RESPIRATORY (INHALATION) ONCE
Status: COMPLETED | OUTPATIENT
Start: 2025-06-13 | End: 2025-06-13

## 2025-06-13 RX ADMIN — ALBUTEROL SULFATE 2.5 MG: 2.5 SOLUTION RESPIRATORY (INHALATION) at 09:06

## 2025-06-18 ENCOUNTER — RESULTS FOLLOW-UP (OUTPATIENT)
Dept: FAMILY MEDICINE | Facility: OTHER | Age: 60
End: 2025-06-18

## 2025-08-15 PROBLEM — J45.40 MODERATE PERSISTENT ASTHMA WITHOUT COMPLICATION: Status: ACTIVE | Noted: 2025-08-15

## 2025-08-15 PROBLEM — G47.01 INSOMNIA DUE TO MEDICAL CONDITION: Status: ACTIVE | Noted: 2025-08-15
